# Patient Record
Sex: MALE | Race: WHITE
[De-identification: names, ages, dates, MRNs, and addresses within clinical notes are randomized per-mention and may not be internally consistent; named-entity substitution may affect disease eponyms.]

---

## 2018-02-26 ENCOUNTER — HOSPITAL ENCOUNTER (OUTPATIENT)
Dept: HOSPITAL 76 - LAB.WCP | Age: 53
Discharge: HOME | End: 2018-02-26
Attending: FAMILY MEDICINE
Payer: COMMERCIAL

## 2018-02-26 DIAGNOSIS — Z12.5: ICD-10-CM

## 2018-02-26 DIAGNOSIS — I10: ICD-10-CM

## 2018-02-26 DIAGNOSIS — R53.83: ICD-10-CM

## 2018-02-26 DIAGNOSIS — E87.5: Primary | ICD-10-CM

## 2018-02-26 DIAGNOSIS — E78.5: ICD-10-CM

## 2018-02-26 DIAGNOSIS — R06.09: ICD-10-CM

## 2018-02-26 LAB
ALBUMIN DIAFP-MCNC: 4.3 G/DL (ref 3.2–5.5)
ALBUMIN/GLOB SERPL: 1.3 {RATIO} (ref 1–2.2)
ALP SERPL-CCNC: 68 IU/L (ref 42–121)
ALT SERPL W P-5'-P-CCNC: 11 IU/L (ref 10–60)
ANION GAP SERPL CALCULATED.4IONS-SCNC: 8 MMOL/L (ref 6–13)
AST SERPL W P-5'-P-CCNC: 49 IU/L (ref 10–42)
BASOPHILS NFR BLD AUTO: 0 10^3/UL (ref 0–0.1)
BASOPHILS NFR BLD AUTO: 0.5 %
BILIRUB BLD-MCNC: 0.8 MG/DL (ref 0.2–1)
BUN SERPL-MCNC: 15 MG/DL (ref 6–20)
CALCIUM UR-MCNC: 8.7 MG/DL (ref 8.5–10.3)
CHLORIDE SERPL-SCNC: 103 MMOL/L (ref 101–111)
CHOLEST SERPL-MCNC: 225 MG/DL
CO2 SERPL-SCNC: 25 MMOL/L (ref 21–32)
CREAT SERPLBLD-SCNC: 0.9 MG/DL (ref 0.6–1.2)
EOSINOPHIL # BLD AUTO: 0.3 10^3/UL (ref 0–0.7)
EOSINOPHIL NFR BLD AUTO: 3.3 %
ERYTHROCYTE [DISTWIDTH] IN BLOOD BY AUTOMATED COUNT: 13.7 % (ref 12–15)
GFRSERPLBLD MDRD-ARVRAT: 89 ML/MIN/{1.73_M2} (ref 89–?)
GLOBULIN SER-MCNC: 3.3 G/DL (ref 2.1–4.2)
GLUCOSE SERPL-MCNC: 87 MG/DL (ref 70–100)
HDLC SERPL-MCNC: 33 MG/DL
HDLC SERPL: 6.8 {RATIO} (ref ?–5)
HGB UR QL STRIP: 15.8 G/DL (ref 14–18)
LDLC SERPL CALC-MCNC: 147 MG/DL
LDLC/HDLC SERPL: 4.5 {RATIO} (ref ?–3.6)
LYMPHOCYTES # SPEC AUTO: 2.6 10^3/UL (ref 1.5–3.5)
LYMPHOCYTES NFR BLD AUTO: 29.8 %
MCH RBC QN AUTO: 30.5 PG (ref 27–31)
MCHC RBC AUTO-ENTMCNC: 33.1 G/DL (ref 32–36)
MCV RBC AUTO: 92 FL (ref 80–94)
MONOCYTES # BLD AUTO: 0.9 10^3/UL (ref 0–1)
MONOCYTES NFR BLD AUTO: 9.8 %
NEUTROPHILS # BLD AUTO: 5 10^3/UL (ref 1.5–6.6)
NEUTROPHILS # SNV AUTO: 8.8 X10^3/UL (ref 4.8–10.8)
NEUTROPHILS NFR BLD AUTO: 56.6 %
PDW BLD AUTO: 8.6 FL (ref 7.4–11.4)
PLATELET # BLD: 186 10^3/UL (ref 130–450)
PROT SPEC-MCNC: 7.6 G/DL (ref 6.7–8.2)
RBC MAR: 5.19 10^6/UL (ref 4.7–6.1)
SODIUM SERPLBLD-SCNC: 136 MMOL/L (ref 135–145)
VLDLC SERPL-SCNC: 45 MG/DL

## 2018-02-26 PROCEDURE — 83880 ASSAY OF NATRIURETIC PEPTIDE: CPT

## 2018-02-26 PROCEDURE — 80061 LIPID PANEL: CPT

## 2018-02-26 PROCEDURE — 36415 COLL VENOUS BLD VENIPUNCTURE: CPT

## 2018-02-26 PROCEDURE — 84153 ASSAY OF PSA TOTAL: CPT

## 2018-02-26 PROCEDURE — 84443 ASSAY THYROID STIM HORMONE: CPT

## 2018-02-26 PROCEDURE — 85025 COMPLETE CBC W/AUTO DIFF WBC: CPT

## 2018-02-26 PROCEDURE — 83721 ASSAY OF BLOOD LIPOPROTEIN: CPT

## 2018-02-26 PROCEDURE — 80053 COMPREHEN METABOLIC PANEL: CPT

## 2019-04-11 ENCOUNTER — HOSPITAL ENCOUNTER (OUTPATIENT)
Dept: HOSPITAL 76 - LAB.WCP | Age: 54
Discharge: HOME | End: 2019-04-11
Attending: FAMILY MEDICINE
Payer: COMMERCIAL

## 2019-04-11 DIAGNOSIS — I10: ICD-10-CM

## 2019-04-11 DIAGNOSIS — G20: Primary | ICD-10-CM

## 2019-04-11 DIAGNOSIS — F41.9: ICD-10-CM

## 2019-04-11 LAB
ALBUMIN DIAFP-MCNC: 4.3 G/DL (ref 3.2–5.5)
ALBUMIN/GLOB SERPL: 1.3 {RATIO} (ref 1–2.2)
ALP SERPL-CCNC: 78 IU/L (ref 42–121)
ALT SERPL W P-5'-P-CCNC: 11 IU/L (ref 10–60)
ANION GAP SERPL CALCULATED.4IONS-SCNC: 6 MMOL/L (ref 6–13)
APTT PPP: 30.2 SECS (ref 24.9–33.3)
AST SERPL W P-5'-P-CCNC: 40 IU/L (ref 10–42)
BASOPHILS NFR BLD AUTO: 0 10^3/UL (ref 0–0.1)
BASOPHILS NFR BLD AUTO: 0.6 %
BILIRUB BLD-MCNC: 0.9 MG/DL (ref 0.2–1)
BUN SERPL-MCNC: 17 MG/DL (ref 6–20)
CALCIUM UR-MCNC: 8.7 MG/DL (ref 8.5–10.3)
CHLORIDE SERPL-SCNC: 104 MMOL/L (ref 101–111)
CO2 SERPL-SCNC: 29 MMOL/L (ref 21–32)
CREAT SERPLBLD-SCNC: 0.9 MG/DL (ref 0.6–1.2)
EOSINOPHIL # BLD AUTO: 0.1 10^3/UL (ref 0–0.7)
EOSINOPHIL NFR BLD AUTO: 2.1 %
ERYTHROCYTE [DISTWIDTH] IN BLOOD BY AUTOMATED COUNT: 13.8 % (ref 12–15)
GFRSERPLBLD MDRD-ARVRAT: 89 ML/MIN/{1.73_M2} (ref 89–?)
GLOBULIN SER-MCNC: 3.3 G/DL (ref 2.1–4.2)
GLUCOSE SERPL-MCNC: 100 MG/DL (ref 70–100)
HGB UR QL STRIP: 15.9 G/DL (ref 14–18)
INR PPP: 1 (ref 0.8–1.2)
LYMPHOCYTES # SPEC AUTO: 1.8 10^3/UL (ref 1.5–3.5)
LYMPHOCYTES NFR BLD AUTO: 27.2 %
MCH RBC QN AUTO: 31 PG (ref 27–31)
MCHC RBC AUTO-ENTMCNC: 33.8 G/DL (ref 32–36)
MCV RBC AUTO: 91.5 FL (ref 80–94)
MONOCYTES # BLD AUTO: 0.7 10^3/UL (ref 0–1)
MONOCYTES NFR BLD AUTO: 9.7 %
NEUTROPHILS # BLD AUTO: 4.1 10^3/UL (ref 1.5–6.6)
NEUTROPHILS # SNV AUTO: 6.7 X10^3/UL (ref 4.8–10.8)
NEUTROPHILS NFR BLD AUTO: 60.4 %
PDW BLD AUTO: 8.1 FL (ref 7.4–11.4)
PLATELET # BLD: 173 10^3/UL (ref 130–450)
PROT SPEC-MCNC: 7.6 G/DL (ref 6.7–8.2)
PROTHROM ACT/NOR PPP: 11.3 SECS (ref 9.9–12.6)
RBC MAR: 5.15 10^6/UL (ref 4.7–6.1)
SODIUM SERPLBLD-SCNC: 139 MMOL/L (ref 135–145)

## 2019-04-11 PROCEDURE — 80053 COMPREHEN METABOLIC PANEL: CPT

## 2019-04-11 PROCEDURE — 85025 COMPLETE CBC W/AUTO DIFF WBC: CPT

## 2019-04-11 PROCEDURE — 85610 PROTHROMBIN TIME: CPT

## 2019-04-11 PROCEDURE — 87640 STAPH A DNA AMP PROBE: CPT

## 2019-04-11 PROCEDURE — 36415 COLL VENOUS BLD VENIPUNCTURE: CPT

## 2019-04-11 PROCEDURE — 85730 THROMBOPLASTIN TIME PARTIAL: CPT

## 2019-09-13 ENCOUNTER — HOSPITAL ENCOUNTER (EMERGENCY)
Dept: HOSPITAL 76 - ED | Age: 54
Discharge: HOME | End: 2019-09-13
Payer: MEDICARE

## 2019-09-13 VITALS — DIASTOLIC BLOOD PRESSURE: 79 MMHG | SYSTOLIC BLOOD PRESSURE: 133 MMHG

## 2019-09-13 DIAGNOSIS — G20: ICD-10-CM

## 2019-09-13 DIAGNOSIS — L03.115: Primary | ICD-10-CM

## 2019-09-13 PROCEDURE — 99283 EMERGENCY DEPT VISIT LOW MDM: CPT

## 2019-09-13 PROCEDURE — 99282 EMERGENCY DEPT VISIT SF MDM: CPT

## 2019-09-13 NOTE — ED PHYSICIAN DOCUMENTATION
PD HPI SKIN





- Stated complaint


Stated Complaint: RT ANKLE BUG BITE/HOT/PURPLE





- Chief complaint


Chief Complaint: Wound





- History obtained from


History obtained from: Patient





- History of Present Illness


Timing - onset: Other (He thinks something bit him on the back of the right 

ankle may be 5 days ago and since then he has 2 little wounds with increasing 

mildly painful redness around that.  No fevers or chills.)





Review of Systems


Constitutional: denies: Fever, Chills


GI: denies: Abdominal Pain, Nausea, Vomiting


: reports: Reviewed and negative





PD PAST MEDICAL HISTORY





- Past Medical History


Past Medical History: Yes


Neuro: Parkinson's





- Present Medications


Home Medications: 


                                Ambulatory Orders











 Medication  Instructions  Recorded  Confirmed


 


Cephalexin [Keflex] 500 mg PO Q6H #28 capsule 09/13/19 














- Allergies


Allergies/Adverse Reactions: 


                                    Allergies











Allergy/AdvReac Type Severity Reaction Status Date / Time


 


No Known Drug Allergies Allergy   Verified 09/13/19 19:10














- Family History


Family history: reports: Non contributory





PD ED PE NORMAL





- Vitals


Vital signs reviewed: Yes





- General


General: Alert and oriented X 3, No acute distress





- Extremities


Extremities: Other (There is 2 tiny scabs on the back of the right ankle with 

mild surrounding cellulitis.  Normal range of motion.  No masses or swelling.  

No tenderness.  No pain out of proportion to exam.)





- Neuro


Neuro: Alert and oriented X 3, Normal speech





Results





- Vitals


Vitals: 





                               Vital Signs - 24 hr











  09/13/19





  19:08


 


Temperature 37.0 C


 


Heart Rate 86


 


Respiratory 20





Rate 


 


Blood Pressure 137/81 H


 


O2 Saturation 99








                                     Oxygen











O2 Source                      Room air

















Departure





- Departure


Disposition: 01 Home, Self Care


Clinical Impression: 


Cellulitis


Qualifiers:


 Site of cellulitis: extremity Site of cellulitis of extremity: lower extremity 

Laterality: right Qualified Code(s): L03.115 - Cellulitis of right lower limb





Condition: Good


Record reviewed to determine appropriate education?: Yes


Instructions:  Cellulitis Dc


Prescriptions: 


Cephalexin [Keflex] 500 mg PO Q6H #28 capsule


Comments: 


Return if worse or if you develop generalized illness such as fevers or chills. 

 Recheck with your doctor on Monday.

## 2020-06-19 ENCOUNTER — HOSPITAL ENCOUNTER (EMERGENCY)
Dept: HOSPITAL 76 - ED | Age: 55
Discharge: HOME | End: 2020-06-19
Payer: MEDICARE

## 2020-06-19 ENCOUNTER — HOSPITAL ENCOUNTER (OUTPATIENT)
Dept: HOSPITAL 76 - EMS | Age: 55
Discharge: ANOTHER HOSPITAL | End: 2020-06-19
Attending: SURGERY
Payer: MEDICARE

## 2020-06-19 VITALS — DIASTOLIC BLOOD PRESSURE: 65 MMHG | SYSTOLIC BLOOD PRESSURE: 132 MMHG

## 2020-06-19 DIAGNOSIS — S09.90XA: ICD-10-CM
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
## 2020-06-19 DIAGNOSIS — S01.01XA: Primary | ICD-10-CM
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
## 2020-06-19 DIAGNOSIS — S01.81XA: Primary | ICD-10-CM

## 2020-06-19 DIAGNOSIS — Z96.82: ICD-10-CM

## 2020-06-19 DIAGNOSIS — G20: ICD-10-CM

## 2020-06-19 PROCEDURE — 12032 INTMD RPR S/A/T/EXT 2.6-7.5: CPT

## 2020-06-19 PROCEDURE — 70450 CT HEAD/BRAIN W/O DYE: CPT

## 2020-06-19 NOTE — CT REPORT
PROCEDURE:  HEAD WO

 

INDICATIONS:  head trauma at site of DBS lead

 

TECHNIQUE:  

Noncontrast 4.5 mm thick angled axial sections acquired from the foramen magnum to the vertex.  For r
adiation dose reduction, the following was used:  automated exposure control, adjustment of mA and/or
 kV according to patient size.

 

COMPARISON:  None available at the time of this dictation.

 

FINDINGS:  

Image quality: There is streak artifact from the neural stimulators. CSF spaces:  Basal cisterns are 
patent.  No extra-axial fluid collections.  Ventricles are normal in size and shape.  

 

Brain:  Bilateral neural stimulators are seen. No midline shift.  No intracranial masses or hemorrhag
e.  Gray-white matter interface is normal.  

 

Skull and face:  Focal scalp swelling with high density can be seen involving the right superior fron
kimo region, at the site of the stimulator entry, measuring 1.7 cm. Calvarium and visualized facial belgica
igor are intact, without suspicious lesions.  There is an apparently partially calcified left posterio
r superior scalp lesion that measures 2 cm. An additional apparently partially calcified focus can be
 seen on the right posteriorly and laterally measuring 1.4 cm.

 

Sinuses:  Mild to moderate mucosal thickening is seen within the visualized right maxillary sinus. Vi
sualized sinuses and mastoids are otherwise clear.  

 

 

 

IMPRESSION:  Focal scalp swelling can be seen involving the right superior frontal region at the site
 of the simulator entry. This is most likely related to scalp hemorrhage. Please correlate with histo
ry and physical examination findings. 

 

A similar 2 cm focus can be seen on the left posteriorly and there is a 1.4 cm focus seen on the righ
t posteriorly. 

 

No acute intracranial hemorrhage or other acute intracranial abnormality can be seen.

 

Bilateral neural simulators with associated streak artifact.

 

 

Reviewed by: Frederick Aguilar MD on 6/19/2020 10:46 AM PIOTR

Approved by: Frederick Aguilar MD on 6/19/2020 10:46 AM PIOTR

 

 

Station ID:  [id number]
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
## 2021-08-23 ENCOUNTER — HOSPITAL ENCOUNTER (INPATIENT)
Dept: HOSPITAL 76 - ED | Age: 56
LOS: 3 days | Discharge: HOME | DRG: 340 | End: 2021-08-26
Attending: SURGERY | Admitting: SURGERY
Payer: MEDICARE

## 2021-08-23 DIAGNOSIS — G20: ICD-10-CM

## 2021-08-23 DIAGNOSIS — Z20.822: ICD-10-CM

## 2021-08-23 DIAGNOSIS — Z79.899: ICD-10-CM

## 2021-08-23 DIAGNOSIS — Z96.89: ICD-10-CM

## 2021-08-23 DIAGNOSIS — K35.33: Primary | ICD-10-CM

## 2021-08-23 DIAGNOSIS — E66.9: ICD-10-CM

## 2021-08-23 DIAGNOSIS — B96.20: ICD-10-CM

## 2021-08-23 LAB
ALBUMIN DIAFP-MCNC: 4.4 G/DL (ref 3.2–5.5)
ALBUMIN/GLOB SERPL: 1.3 {RATIO} (ref 1–2.2)
ALP SERPL-CCNC: 63 IU/L (ref 42–121)
ALT SERPL W P-5'-P-CCNC: 49 IU/L (ref 10–60)
ANION GAP SERPL CALCULATED.4IONS-SCNC: 13 MMOL/L (ref 6–13)
AST SERPL W P-5'-P-CCNC: 64 IU/L (ref 10–42)
B PARAPERT DNA SPEC QL NAA+PROBE: NOT DETECTED
B PERT DNA SPEC QL NAA+PROBE: NOT DETECTED
BASOPHILS NFR BLD AUTO: 0 10^3/UL (ref 0–0.1)
BASOPHILS NFR BLD AUTO: 0.3 %
BILIRUB BLD-MCNC: 1.6 MG/DL (ref 0.2–1)
BUN SERPL-MCNC: 30 MG/DL (ref 6–20)
C PNEUM DNA NPH QL NAA+NON-PROBE: NOT DETECTED
CALCIUM UR-MCNC: 8.7 MG/DL (ref 8.5–10.3)
CHLORIDE SERPL-SCNC: 98 MMOL/L (ref 101–111)
CLARITY UR REFRACT.AUTO: CLEAR
CO2 SERPL-SCNC: 21 MMOL/L (ref 21–32)
CREAT SERPLBLD-SCNC: 1.3 MG/DL (ref 0.6–1.2)
EOSINOPHIL # BLD AUTO: 0.3 10^3/UL (ref 0–0.7)
EOSINOPHIL NFR BLD AUTO: 2.5 %
ERYTHROCYTE [DISTWIDTH] IN BLOOD BY AUTOMATED COUNT: 13.2 % (ref 12–15)
FLUAV RNA RESP QL NAA+PROBE: NOT DETECTED
GFRSERPLBLD MDRD-ARVRAT: 58 ML/MIN/{1.73_M2} (ref 89–?)
GLOBULIN SER-MCNC: 3.4 G/DL (ref 2.1–4.2)
GLUCOSE SERPL-MCNC: 137 MG/DL (ref 70–100)
GLUCOSE UR QL STRIP.AUTO: NEGATIVE MG/DL
HAEM INFLU B DNA SPEC QL NAA+PROBE: NOT DETECTED
HCOV 229E RNA SPEC QL NAA+PROBE: NOT DETECTED
HCOV HKU1 RNA UPPER RESP QL NAA+PROBE: NOT DETECTED
HCOV NL63 RNA ASPIRATE QL NAA+PROBE: NOT DETECTED
HCOV OC43 RNA SPEC QL NAA+PROBE: NOT DETECTED
HCT VFR BLD AUTO: 47.6 % (ref 42–52)
HGB UR QL STRIP: 16.1 G/DL (ref 14–18)
HMPV AG SPEC QL: NOT DETECTED
HPIV1 RNA NPH QL NAA+PROBE: NOT DETECTED
HPIV2 SPEC QL CULT: NOT DETECTED
HPIV3 AB TITR SER CF: NOT DETECTED {TITER}
HPIV4 RNA SPEC QL NAA+PROBE: NOT DETECTED
KETONES UR QL STRIP.AUTO: 15 MG/DL
LIPASE SERPL-CCNC: 18 U/L (ref 22–51)
LYMPHOCYTES # SPEC AUTO: 0.5 10^3/UL (ref 1.5–3.5)
LYMPHOCYTES NFR BLD AUTO: 4.3 %
M PNEUMO DNA SPEC QL NAA+PROBE: NOT DETECTED
MCH RBC QN AUTO: 30.8 PG (ref 27–31)
MCHC RBC AUTO-ENTMCNC: 33.8 G/DL (ref 32–36)
MCV RBC AUTO: 91 FL (ref 80–94)
MONOCYTES # BLD AUTO: 0.8 10^3/UL (ref 0–1)
MONOCYTES NFR BLD AUTO: 6.5 %
MUCOUS THREADS URNS QL MICRO: (no result)
NEUTROPHILS # BLD AUTO: 10.2 10^3/UL (ref 1.5–6.6)
NEUTROPHILS # SNV AUTO: 11.9 X10^3/UL (ref 4.8–10.8)
NEUTROPHILS NFR BLD AUTO: 85.9 %
NITRITE UR QL STRIP.AUTO: NEGATIVE
NRBC # BLD AUTO: 0 /100WBC
NRBC # BLD AUTO: 0 X10^3/UL
PDW BLD AUTO: 9.4 FL (ref 7.4–11.4)
PH UR STRIP.AUTO: 5.5 PH (ref 5–7.5)
PLATELET # BLD: 120 10^3/UL (ref 130–450)
POTASSIUM SERPL-SCNC: 3.1 MMOL/L (ref 3.5–5)
PROT SPEC-MCNC: 7.8 G/DL (ref 6.7–8.2)
PROT UR STRIP.AUTO-MCNC: 30 MG/DL
RBC # UR STRIP.AUTO: (no result) /UL
RBC # URNS HPF: (no result) /HPF (ref 0–5)
RBC MAR: 5.23 10^6/UL (ref 4.7–6.1)
RBC MORPH BLD: (no result)
RSV RNA RESP QL NAA+PROBE: NOT DETECTED
RV+EV RNA SPEC QL NAA+PROBE: NOT DETECTED
SARS-COV-2 RNA PNL SPEC NAA+PROBE: NOT DETECTED
SODIUM SERPLBLD-SCNC: 132 MMOL/L (ref 135–145)
SP GR UR STRIP.AUTO: 1.02 (ref 1–1.03)
SQUAMOUS URNS QL MICRO: (no result)
UROBILINOGEN UR QL STRIP.AUTO: (no result) E.U./DL
UROBILINOGEN UR STRIP.AUTO-MCNC: NEGATIVE MG/DL
WBC # UR MANUAL: (no result) /HPF (ref 0–3)

## 2021-08-23 PROCEDURE — 74177 CT ABD & PELVIS W/CONTRAST: CPT

## 2021-08-23 PROCEDURE — 99285 EMERGENCY DEPT VISIT HI MDM: CPT

## 2021-08-23 PROCEDURE — 96365 THER/PROPH/DIAG IV INF INIT: CPT

## 2021-08-23 PROCEDURE — 96375 TX/PRO/DX INJ NEW DRUG ADDON: CPT

## 2021-08-23 PROCEDURE — 81003 URINALYSIS AUTO W/O SCOPE: CPT

## 2021-08-23 PROCEDURE — 80048 BASIC METABOLIC PNL TOTAL CA: CPT

## 2021-08-23 PROCEDURE — 85025 COMPLETE CBC W/AUTO DIFF WBC: CPT

## 2021-08-23 PROCEDURE — 0DTJ4ZZ RESECTION OF APPENDIX, PERCUTANEOUS ENDOSCOPIC APPROACH: ICD-10-PCS | Performed by: SURGERY

## 2021-08-23 PROCEDURE — 87181 SC STD AGAR DILUTION PER AGT: CPT

## 2021-08-23 PROCEDURE — 87070 CULTURE OTHR SPECIMN AEROBIC: CPT

## 2021-08-23 PROCEDURE — 87205 SMEAR GRAM STAIN: CPT

## 2021-08-23 PROCEDURE — 87086 URINE CULTURE/COLONY COUNT: CPT

## 2021-08-23 PROCEDURE — 80053 COMPREHEN METABOLIC PANEL: CPT

## 2021-08-23 PROCEDURE — 81001 URINALYSIS AUTO W/SCOPE: CPT

## 2021-08-23 PROCEDURE — 0202U NFCT DS 22 TRGT SARS-COV-2: CPT

## 2021-08-23 PROCEDURE — 99284 EMERGENCY DEPT VISIT MOD MDM: CPT

## 2021-08-23 PROCEDURE — 36415 COLL VENOUS BLD VENIPUNCTURE: CPT

## 2021-08-23 PROCEDURE — 44970 LAPAROSCOPY APPENDECTOMY: CPT

## 2021-08-23 PROCEDURE — 83690 ASSAY OF LIPASE: CPT

## 2021-08-23 PROCEDURE — 87631 RESP VIRUS 3-5 TARGETS: CPT

## 2021-08-23 RX ADMIN — SODIUM CHLORIDE SCH MLS/HR: 9 INJECTION, SOLUTION INTRAVENOUS at 20:45

## 2021-08-23 RX ADMIN — SODIUM CHLORIDE, PRESERVATIVE FREE SCH: 5 INJECTION INTRAVENOUS at 17:47

## 2021-08-23 RX ADMIN — ACETAMINOPHEN SCH MLS/HR: 10 INJECTION, SOLUTION INTRAVENOUS at 23:50

## 2021-08-23 RX ADMIN — CARBIDOPA AND LEVODOPA SCH TAB: 25; 100 TABLET ORAL at 16:58

## 2021-08-23 RX ADMIN — SODIUM CHLORIDE, PRESERVATIVE FREE SCH: 5 INJECTION INTRAVENOUS at 23:53

## 2021-08-23 RX ADMIN — ACETAMINOPHEN SCH MLS/HR: 10 INJECTION, SOLUTION INTRAVENOUS at 13:35

## 2021-08-23 RX ADMIN — SODIUM CHLORIDE SCH MLS/HR: 9 INJECTION, SOLUTION INTRAVENOUS at 14:12

## 2021-08-23 RX ADMIN — BENZOCAINE AND MENTHOL PRN LOZENGE: 15; 3.6 LOZENGE ORAL at 20:44

## 2021-08-23 RX ADMIN — ACETAMINOPHEN SCH MLS/HR: 10 INJECTION, SOLUTION INTRAVENOUS at 17:53

## 2021-08-23 RX ADMIN — ONDANSETRON PRN MG: 2 INJECTION INTRAMUSCULAR; INTRAVENOUS at 19:19

## 2021-08-23 RX ADMIN — GABAPENTIN SCH MG: 100 CAPSULE ORAL at 20:45

## 2021-08-23 RX ADMIN — CARBIDOPA AND LEVODOPA SCH TAB: 25; 100 TABLET ORAL at 19:15

## 2021-08-23 RX ADMIN — KETOROLAC TROMETHAMINE PRN MG: 30 INJECTION, SOLUTION INTRAMUSCULAR at 19:24

## 2021-08-23 RX ADMIN — SODIUM CHLORIDE SCH MLS/HR: 9 INJECTION, SOLUTION INTRAVENOUS at 14:30

## 2021-08-23 NOTE — ANESTHESIA
Pre-Anesthesia VS, & Labs





- Diagnosis





acute appendicitis





- Procedure





laparoscopic appendectomy


Vital Signs: 





                                        











Temp Pulse Resp BP Pulse Ox


 


 36.6 C   90   16   121/67   94 


 


 08/23/21 09:48  08/23/21 09:35  08/23/21 09:35  08/23/21 09:48  08/23/21 09:35











Height: 5 ft 11 in


Weight (kg): 119.748 kg


Body Mass Index: 36.8


BMI Classification: Obese





- NPO


>8 hours


Last Fluid Intake: sips water this am





- Lab Results


Current Lab Results: 





Laboratory Tests





08/23/21 07:46: Sodium 132 L, Potassium 3.1 L, Chloride 98 L, Carbon Dioxide 21,

Anion Gap 13.0, BUN 30 H, Creatinine 1.3 H, Estimated GFR (MDRD) 58 L, Glucose 

137 H, Calcium 8.7, Total Bilirubin 1.6 H, AST 64 H, ALT 49, Alkaline 

Phosphatase 63, Total Protein 7.8, Albumin 4.4, Globulin 3.4, Albumin/Globulin 

Ratio 1.3, Lipase 18 L


08/23/21 07:46: WBC 11.9 H, RBC 5.23, Hgb 16.1, Hct 47.6, MCV 91.0, MCH 30.8, 

MCHC 33.8, RDW 13.2, Plt Count 120 L, MPV 9.4, Neut # (Auto) 10.2 H, Lymph # (Au

to) 0.5 L, Mono # (Auto) 0.8, Eos # (Auto) 0.3, Baso # (Auto) 0.0, Absolute 

Nucleated RBC 0.00, Nucleated RBC % 0.0, Manual Slide Review Indicated, RBC 

Morph Micro Appear 1+ ANISOCYTOSIS








Lab results reviewed: Yes


Fish Bones: 


                                 08/23/21 07:46





                                 08/23/21 07:46





Home Medications and Allergies


Home Medications: 


Ambulatory Orders





Carbidopa/Levodopa 25/100 [Sinemet 25 mg/100 mg] 2 tab PO DAILY 08/23/21 


Gabapentin [Neurontin] 2 tab PO HS 08/23/21 


Ropinirole HCl [Ropinirole ER] 2 mg PO BID 08/23/21 


Sertraline [Zoloft] 1 tab PO DAILY 08/23/21 











                                        





Carbidopa/Levodopa 25/100 [Sinemet 25 mg/100 mg] 2 tab PO DAILY 08/23/21 


Gabapentin [Neurontin] 2 tab PO HS 08/23/21 


Ropinirole HCl [Ropinirole ER] 2 mg PO BID 08/23/21 


Sertraline [Zoloft] 1 tab PO DAILY 08/23/21 








Allergies/Adverse Reactions: 


                                    Allergies











Allergy/AdvReac Type Severity Reaction Status Date / Time


 


No Known Drug Allergies Allergy   Verified 08/23/21 07:44














Anes History & Medical History





- Anesthetic History


Anesthesia Complications: reports: No previous complications


Family history of Anesthesia Complications: Denies


Family history of Malignant Hyperthermia: Denies





- Medical History


Cardiovascular: reports: None


Pulmonary: reports: None


Neuro: reports: Parkinson's (DBS turned off for surgery)


Endocrine/Autoimmune: reports: None


Smoking Status: Never smoker


Other Past Medical History: with deep brain stimulator





Exam


General: Alert, Oriented x3, Cooperative


Dental: WNL


Mouth Opening: 3 Fingerbreadth


Neck Mobility: Normal


Mallampati classification: I


Thyromental Distance: greater than 6 cm


Respiratory: Lungs clear, Normal breath sounds, No respiratory distress


Cardiovascular: Regular rate


Abdomen: Other (tender at LRQ)


Neurological: Normal speech


Mental/Cognitive Status: Alert/Oriented X3, Normal for patient


Cognitive Status: Within normal limits





Plan


Anesthesia Type: General


Consent for Procedure(s) Verified and Reviewed: Yes


Code Status: Attempt Resuscitation


ASA classification: 3-Severe systemic disease


Is this case an emergency?: No

## 2021-08-23 NOTE — ED PHYSICIAN DOCUMENTATION
PD HPI ABD PAIN





- Stated complaint


Stated Complaint: ABD PX





- Chief complaint


Chief Complaint: Abd Pain





- History obtained from


History obtained from: Patient





- History of Present Illness


Timing - onset: How many days ago (2)


Timing - duration: Days (2)


Timing - details: Gradual onset, Still present


Quality: Cramping, Aching, Pain


Location: RLQ (but has generalized, with some abd bloating and nausea/vomiting 

the past day.)


Radiation: No: Chest, Right flank


Improved by: No: Eating (has not eaten since yesterday), Vomiting


Worsened by: Eating, Moving, Palpation


Associated symptoms: Nausea, Vomiting.  No: Fever, Diarrhea (soft stool today as

tried stool softener yesterday. No change in pain.), Constipation


Similar symptoms before: Has not had sx before


Recently seen: Not recently seen





Review of Systems


Constitutional: denies: Fever, Chills


Nose: denies: Rhinorrhea / runny nose, Congestion


Throat: denies: Sore throat


Respiratory: denies: Cough


GI: reports: Abdominal Pain, Abdominal Swelling (today feeling distended), 

Nausea, Vomiting.  denies: Constipation, Diarrhea, Bloody / black stool


: denies: Dysuria, Frequency


Skin: denies: Rash, Lesions


Musculoskeletal: denies: Back pain, Extremity swelling


Neurologic: reports: Other (baseline tremor due to Parkinsons.).  denies: 

Generalized weakness, Near syncope


Endocrine: denies: Weight loss


Immunocompromised: denies: Immunocompromised





PD PAST MEDICAL HISTORY





- Past Medical History


Cardiovascular: None


Respiratory: None


Neuro: Parkinson's


Endocrine/Autoimmune: None


Other Past Medical History: with deep brain stimulator





- Past Surgical History


Past Surgical History: No





- Present Medications


Home Medications: 


                                Ambulatory Orders











 Medication  Instructions  Recorded  Confirmed


 


cephALEXin [Keflex] 500 mg PO Q6H #28 capsule 09/13/19 


 


Carbidopa/Levodopa 25/100 [Sinemet 2 tab PO DAILY 08/23/21 08/23/21





25 mg/100 mg]   


 


Gabapentin [Neurontin] 2 tab PO HS 08/23/21 08/23/21


 


Ropinirole HCl [Ropinirole ER] 2 mg PO BID 08/23/21 08/23/21


 


Sertraline [Zoloft] 1 tab PO DAILY 08/23/21 08/23/21














- Allergies


Allergies/Adverse Reactions: 


                                    Allergies











Allergy/AdvReac Type Severity Reaction Status Date / Time


 


No Known Drug Allergies Allergy   Verified 08/23/21 07:44














- Social History


Does the pt smoke?: No


Smoking Status: Never smoker


Does the pt drink ETOH?: Yes


Does the pt have substance abuse?: No





- Immunizations


Immunizations are current?: No





- POLST


Patient has POLST: No





PD ED PE NORMAL





- Vitals


Vital signs reviewed: Yes





- General


General: Alert and oriented X 3, Well developed/nourished, Other (appears 

uncomfortable due to abd pain. )





- HEENT


HEENT: Pharynx benign.  No: Moist mucous membranes





- Neck


Neck: Supple, no meningeal sign, No adenopathy





- Cardiac


Cardiac: RRR (regular but mild tachycardia. ), No murmur





- Respiratory


Respiratory: No respiratory distress, Clear bilaterally





- Abdomen


Abdomen: Soft, No organomegaly, Other (moderate distention of abd with tender 

RLQ locally with guarding and percussion. No referred tenderness. Decreased 

bowel sounds. )





- Male 


Male : Deferred





- Rectal


Rectal: Deferred





- Derm


Derm: Normal color





- Neuro


Neuro: Alert and oriented X 3, No motor deficit, Normal speech, Other (mild 

tremoring in arms/hands c/w his Parkinsons. )





- Psych


Psych: Normal mood, Normal affect





Results





- Vitals


Vitals: 


                               Vital Signs - 24 hr











  08/23/21 08/23/21 08/23/21





  07:29 09:35 09:48


 


Temperature 36.6 C  36.6 C


 


Heart Rate 104 H 90 


 


Respiratory 22 16 





Rate   


 


Blood Pressure 136/95 H  121/67


 


O2 Saturation 95 94 








                                     Oxygen











O2 Source                      Room air

















- Labs


Labs: 


                                Laboratory Tests











  08/23/21 08/23/21





  07:46 07:46


 


WBC  11.9 H 


 


RBC  5.23 


 


Hgb  16.1 


 


Hct  47.6 


 


MCV  91.0 


 


MCH  30.8 


 


MCHC  33.8 


 


RDW  13.2 


 


Plt Count  120 L 


 


MPV  9.4 


 


Neut # (Auto)  10.2 H 


 


Lymph # (Auto)  0.5 L 


 


Mono # (Auto)  0.8 


 


Eos # (Auto)  0.3 


 


Baso # (Auto)  0.0 


 


Absolute Nucleated RBC  0.00 


 


Nucleated RBC %  0.0 


 


Manual Slide Review  Indicated 


 


RBC Morph Micro Appear  1+ ANISOCYTOSIS 


 


Sodium   132 L


 


Potassium   3.1 L


 


Chloride   98 L


 


Carbon Dioxide   21


 


Anion Gap   13.0


 


BUN   30 H


 


Creatinine   1.3 H


 


Estimated GFR (MDRD)   58 L


 


Glucose   137 H


 


Calcium   8.7


 


Total Bilirubin   1.6 H


 


AST   64 H


 


ALT   49


 


Alkaline Phosphatase   63


 


Total Protein   7.8


 


Albumin   4.4


 


Globulin   3.4


 


Albumin/Globulin Ratio   1.3


 


Lipase   18 L














- Rads (name of study)


  ** abd/pelvic CT


Radiology: Prelim report reviewed (acute appendicitis without perforation nor 

abscess. Some wall thickening distal ileum. ), See rad report





PD MEDICAL DECISION MAKING





- ED course


Complexity details: reviewed results (acute appendicitis with some wall 

thickening distal ileum. No perforation nor abscess. ), re-evaluated patient 

(pain improved with meds. ), considered differential (concerning for apy, kidney

 stone, diverticulitis, or other process. ), d/w patient





Departure





- Departure


Disposition: ED Transfer to [facility]


Clinical Impression: 


Appendicitis, acute


Qualifiers:


 Acute appendicitis type: with localized peritonitis Appendicitis gangrene 

presence: without gangrene Appendicitis perforation presence: without 

perforation Appendicitis abscess presence: without abscess Qualified Code(s): 

K35.30 - Acute appendicitis with localized peritonitis, without perforation or 

gangrene





Condition: Stable


Record reviewed to determine appropriate education?: Yes

## 2021-08-23 NOTE — ANESTHESIA POST OP EVALUATION
Anesthesia Post Eval





- Post Anesthesia Eval


Vitals: 





                                Last Vital Signs











Temp  37.1 C   08/23/21 14:56


 


Pulse  84   08/23/21 14:56


 


Resp  20   08/23/21 14:56


 


BP  126/80   08/23/21 14:56


 


Pulse Ox  97   08/23/21 14:56











CV Function Including HR & BP: Stable


Pain Control: Satisfactory


Nausea & Vomiting: Negative


Mental Status: Baseline


Respiratory Status: Airway Patent


Hydration Status: Satisfactory


Anesthesia Complications: None

## 2021-08-23 NOTE — HISTORY & PHYSICAL EXAMINATION
HPI





- Admitted From


Admitted from: ED





- History Obtained From


History obtained from: Patient


Exam limitations: No limitations





- History of Present Illness


Severity at the worst: reports: Moderate


Pain Quality: reports: Aching, Cramping


Context-Pain started w/: reports: Rest


Timing: reports: Gradual onset


Duration: reports: Days: (2)


Worsened by: reports: Exertion, Movement, Palpation


Associated symptoms: reports: Nausea


HPI Comment/Other: 





Very pleasant 55-year-old gentleman who presents with 2 days of right lower 

quadrant pain.  He has a history of Parkinson's disease and has a deep brain 

stimulator.  He says he has never had pain like this in the past.  He thought 

maybe he was impacted or constipated and so presented to the emergency room for 

relief.  He says he has been nauseated and has not eaten in the last 12 hours.





PMH/PSH





- Past Medical History


Cardiovascular: positive: None


Respiratory: positive: None


Neuro: positive: Parkinson's


Endocrine/Autoimmune: positive: None


MRSA Hx?: No


Other Past Medical History: with deep brain stimulator





Social & Family Hx





- Living Situation


Living Arrangement: At home


Living Situation: Alone (Has a cat and good relationship with land lord)





- Social History


Does the pt smoke?: No


Smoking Status: Never smoker


Does the pt drink ETOH?: Yes


Does the pt have substance abuse?: No





- POLST


Patient has POLST: No





Meds/Allgy





- Home Medications


Home Medications: 


                                Ambulatory Orders











 Medication  Instructions  Recorded  Confirmed


 


cephALEXin [Keflex] 500 mg PO Q6H #28 capsule 09/13/19 


 


Carbidopa/Levodopa 25/100 [Sinemet 2 tab PO DAILY 08/23/21 08/23/21





25 mg/100 mg]   


 


Gabapentin [Neurontin] 2 tab PO HS 08/23/21 08/23/21


 


Ropinirole HCl [Ropinirole ER] 2 mg PO BID 08/23/21 08/23/21


 


Sertraline [Zoloft] 1 tab PO DAILY 08/23/21 08/23/21














- Allergies


Allergies/Adverse Reactions: 


                                    Allergies











Allergy/AdvReac Type Severity Reaction Status Date / Time


 


No Known Drug Allergies Allergy   Verified 08/23/21 07:44














Review of Systems





- Constitutional


Constitutional: reports: Fatigue, Diaphoresis





- Gastrointestinal


Gastrointestinal: reports: Abdominal pain, Nausea





Exam





- Vital Signs


Reviewed Vital Signs: Yes


Vital Signs: 





                                Vital Signs x48h











  Temp Pulse Resp BP Pulse Ox


 


 08/23/21 09:48  36.6 C    121/67 


 


 08/23/21 09:35   90  16   94


 


 08/23/21 07:29  36.6 C  104 H  22  136/95 H  95














- Physical Exam


General Appearance: positive: Mild distress


Eyes Bilateral: positive: Normal inspection, PERRL, EOMI


Neck: positive: Nml inspection


Respiratory: positive: Chest non-tender, No respiratory distress, Breath sounds 

nml


Cardiovascular: positive: Regular rate & rhythm, No murmur


Peripheral Pulses: positive: 1+


Abdomen: positive: Nml bowel sounds, Tenderness, Guarding


Back: positive: Nml inspection


Skin: positive: Other (dignificant diaphoresis is noted)


Extremities: positive: Non-tender


Neurologic/Psychiatric: positive: Oriented x3, Other (Tremor is noted as he has 

not been able to keep his medication down.)





Results





- Lab Results


Fish Bones: 


                                 08/23/21 07:46





                                 08/23/21 07:46


Other Lab Results: 





                               Lab Results x24hrs











  08/23/21 08/23/21 Range/Units





  07:46 07:46 


 


WBC   11.9 H  (4.8-10.8)  x10^3/uL


 


RBC   5.23  (4.70-6.10)  10^6/uL


 


Hgb   16.1  (14.0-18.0)  g/dL


 


Hct   47.6  (42.0-52.0)  %


 


MCV   91.0  (80.0-94.0)  fL


 


MCH   30.8  (27.0-31.0)  pg


 


MCHC   33.8  (32.0-36.0)  g/dL


 


RDW   13.2  (12.0-15.0)  %


 


Plt Count   120 L  (130-450)  10^3/uL


 


MPV   9.4  (7.4-11.4)  fL


 


Neut # (Auto)   10.2 H  (1.5-6.6)  10^3/uL


 


Lymph # (Auto)   0.5 L  (1.5-3.5)  10^3/uL


 


Mono # (Auto)   0.8  (0.0-1.0)  10^3/uL


 


Eos # (Auto)   0.3  (0.0-0.7)  10^3/uL


 


Baso # (Auto)   0.0  (0.0-0.1)  10^3/uL


 


Absolute Nucleated RBC   0.00  x10^3/uL


 


Nucleated RBC %   0.0  /100WBC


 


Manual Slide Review   Indicated  


 


RBC Morph Micro Appear   1+ ANISOCYTOSIS  (NORMAL)  


 


Sodium  132 L   (135-145)  mmol/L


 


Potassium  3.1 L   (3.5-5.0)  mmol/L


 


Chloride  98 L   (101-111)  mmol/L


 


Carbon Dioxide  21   (21-32)  mmol/L


 


Anion Gap  13.0   (6-13)  


 


BUN  30 H   (6-20)  mg/dL


 


Creatinine  1.3 H   (0.6-1.2)  mg/dL


 


Estimated GFR (MDRD)  58 L   (>89)  


 


Glucose  137 H   ()  mg/dL


 


Calcium  8.7   (8.5-10.3)  mg/dL


 


Total Bilirubin  1.6 H   (0.2-1.0)  mg/dL


 


AST  64 H   (10-42)  IU/L


 


ALT  49   (10-60)  IU/L


 


Alkaline Phosphatase  63   ()  IU/L


 


Total Protein  7.8   (6.7-8.2)  g/dL


 


Albumin  4.4   (3.2-5.5)  g/dL


 


Globulin  3.4   (2.1-4.2)  g/dL


 


Albumin/Globulin Ratio  1.3   (1.0-2.2)  


 


Lipase  18 L   (22-51)  U/L














- Diagnostic Imaging Results


Diagnostic Imaging Results: positive: Final report reviewed


Diagnostic Imaging Results Comments: 





Acute appendicitis without evidence of perforation





- EKG Results


EKG Interpreted Independently: No





Impression/Plan





- Problem List


Problem List: 





Acute appendicitis





The patient has received Zosyn in the emergency room.  We have discussed the 

risks and benefits of laparoscopic appendectomy and the patient is expressed a 

desire to complete the procedure.

## 2021-08-23 NOTE — CT REPORT
PROCEDURE:  Abdomen/Pelvis W

 

INDICATIONS:  lower abd pain for 2 days

 

CONTRAST:  IV CONTRAST: Isovue 300 ml: 100 PO CONTRAST: *NO PO CONTRAST 

 

TECHNIQUE:  

After the administration of     contrast, 5 mm thick sections acquired from the diaphragms to the sym
physis.  5 mm thick coronal and sagittal reformats were acquired.  For radiation dose reduction, the 
following was used:  automated exposure control, adjustment of mA and/or kV according to patient size
.  

 

COMPARISON:  None.

 

FINDINGS:  

Image quality:  Excellent.  

 

ABDOMEN:  

Lung bases:  Lung bases are clear except for lung base atelectasis.  Heart size is normal.  

 

Solid organs:  Liver and spleen are normal in size and enhancement but the liver is prominently diffu
sely fatty infiltrated.  Gallbladder is isodense to the fatty infiltrated liver but shows no inflamma
tion.  Biliary system is non dilated.  Pancreas enhances normally.  No adrenal nodules.  Kidneys demo
nstrate normal size and enhancement, without hydronephrosis.  

 

Peritoneum and bowel: Colonic bowel loops demonstrate normal wall thickness and caliber.  Small bowel
 loops, however, are abnormally dilated and fluid-filled. Maximal caliber is approximately 4.8 cm. Up
per limits of normal is 3.0 cm. This pattern extends towards the terminal ileum. No free fluid or air
.  

 

Nodes and vessels:  No retroperitoneal or mesenteric adenopathy by size criteria.  Aorta and inferior
 vena cava are normal in size.  

 

Miscellaneous:  No ventral hernias.  

 

 

PELVIS:  

Genitourinary:  Bladder wall thickness is normal.  

 

Miscellaneous:  No inguinal hernias or adenopathy.  At the right lower quadrant there is peritoneal i
nflammation and thickening, without significant adjacent free fluid or any identified free air. The a
ppendix is abnormally prominent in caliber, measuring up to 1.2 cm in maximal dimension and with wilfrido
cent peritoneal inflammation. What appears to be a relatively radiolucent appendicolith is seen at th
e base of the appendix as it emanates from the cecum. No periappendiceal abscess is found.

 

Bones:  No suspicious bony lesions.  No vertebral body compression fractures.  

 

IMPRESSION:  Acute appendicitis right lower quadrant, with adjacent prominent peritoneal inflammation
, but no abscess formation.

 

Reactive ileus or inflammatory stenosis involving the small bowel, centered adjacent to the area of a
ppendicitis, with dilatation of the more superior small bowel up to 4.8 cm. No mass, volvulus, or int
ussusception involving the small bowel is seen.

 

Reviewed by: Kalpesh Mckeon MD on 8/23/2021 9:05 AM PDT

Approved by: Kalpesh Mckeon MD on 8/23/2021 9:05 AM PDT

 

 

Station ID:  [id number]

## 2021-08-23 NOTE — OPERATIVE REPORT
Operative Report





- General


Procedure Date: 08/23/21


Planned Procedure: 





Laparoscopic appendectomy


Pre-Op Diagnosis: Acute appendicitis


Procedure Performed: 





Laparoscopic appendectomy with peritoneal lavage and drain placement


Post Op Diagnosis: Perforated appendicitis with right lower quadrant phlegmon 

and abscess





- Procedure Note


Primary Surgeon: Epifanio


Anesthesia Provider: DERICK Root


Anesthesia Technique: General ET tube, Local


Pathology: 





Appendix to pathology in formalin


Estimated Blood Loss (mL): 20


Drain/Tube Type: Samuel drain (19 Ukrainian in the right lower quadrant)


Findings: 





Perforated appendicitis with large right lower quadrant phlegmon and abscess


Complications: 





None apparent





- Other


Other Information/Narrative: 





After obtaining informed consent, the patient is brought to the operating room 

and placed in the supine position on the operating table.  Following successful 

induction of general endotracheal anesthesia, appropriate padding of all bony 

prominences, and placement of appropriate monitors, the abdomen was prepped and 

draped in the standard surgical fashion.  A timeout was held per scope protocol.

 All elements of the surgical safety checklist were followed before, during, and

after the procedure.


Following infiltration with local anesthetic to create a field block, an 

incision was created inferior to the umbilicus and carried down through the skin

and subcutaneous tissue to reveal the fascia below.  2-0 Vicryl retention 

sutures were placed on either side of the midline and the abdomen was entered 

under direct vision using a 15 blade scalpel.  A 10 mm blunt Kelly balloon 

trocar was placed in the abdominal cavity and it was insufflated to 15 mmHg 

pressure.  The patient was placed in Trendelenburg position with the left side 

rotated toward the floor.  The camera was placed in the abdominal cavity and we 

immediately visualized the cecum in the right lower quadrant.  This was 

associated with a large right lower quadrant phlegmon.  Careful dissection 

revealed perforation with abscess and significant diffuse inflammatory response.

 The appendix was identified and eventually followed to its tip to defy the 

entire structure.  The appendix was grasped and elevated revealing its 

attachment to the cecum.  A window was created in the mesoappendix at this 

location.  A laparoscopic stapling device was used to ligate the appendix and 

liberated from its attachment to the cecum.  An additional load of the device 

were used to divide its mesentery.The appendix was placed in an Endo Catch bag 

and removed via the umbilical port. Aspiration of right lower quadrant fluid was

obtained for culture.The wound was irrigated with 1 L of warm saline solution 

and aspirated free of all fluid and particulate matter.A 19 Ukrainian Samuel drain 

was placed in the right lower quadrant and brought out in the right mid 

abdominal port.This was sewn into place with a nylon suture. The table was 

flattened and the abdomen evaluated once again.  The trochars were removed under

direct vision and abdomen was desufflated.  The umbilical incision was closed 

with interrupted Vicryl suture and Monocryl stitches were placed in the skin.  

All sponge, needles, and instrument counts were correct at the conclusion of the

case.  The patient was allowed to wake from anesthesia without difficulty and 

taken to the postanesthesia care unit in good condition.

## 2021-08-23 NOTE — PHARMACY PROGRESS NOTE
- Best Possible Medication History


Admit Date and Time: 08/23/21 1127


Processed by: Nursing


Medication History completed: Yes





As the person ultimately responsible for medication therapy, providers are able 

to order a medication from an existing home medication list in [facility] via the 

"Reconcile Routine" prior to Confirmation of that medication by support staff. 

Such practice is discouraged except when the physician, in their clinical 

judgment, deems that a medical need exists for a medication without regard to 

previous use.

## 2021-08-24 LAB
ALBUMIN DIAFP-MCNC: 3.8 G/DL (ref 3.2–5.5)
ALBUMIN/GLOB SERPL: 1.1 {RATIO} (ref 1–2.2)
ALP SERPL-CCNC: 60 IU/L (ref 42–121)
ALT SERPL W P-5'-P-CCNC: 19 IU/L (ref 10–60)
ANION GAP SERPL CALCULATED.4IONS-SCNC: 12 MMOL/L (ref 6–13)
AST SERPL W P-5'-P-CCNC: 53 IU/L (ref 10–42)
BASOPHILS NFR BLD AUTO: 0 10^3/UL (ref 0–0.1)
BASOPHILS NFR BLD AUTO: 0.2 %
BILIRUB BLD-MCNC: 1.3 MG/DL (ref 0.2–1)
BUN SERPL-MCNC: 27 MG/DL (ref 6–20)
CALCIUM UR-MCNC: 8 MG/DL (ref 8.5–10.3)
CHLORIDE SERPL-SCNC: 101 MMOL/L (ref 101–111)
CO2 SERPL-SCNC: 23 MMOL/L (ref 21–32)
CREAT SERPLBLD-SCNC: 1.1 MG/DL (ref 0.6–1.2)
EOSINOPHIL # BLD AUTO: 0.1 10^3/UL (ref 0–0.7)
EOSINOPHIL NFR BLD AUTO: 0.7 %
ERYTHROCYTE [DISTWIDTH] IN BLOOD BY AUTOMATED COUNT: 13.5 % (ref 12–15)
GFRSERPLBLD MDRD-ARVRAT: 70 ML/MIN/{1.73_M2} (ref 89–?)
GLOBULIN SER-MCNC: 3.4 G/DL (ref 2.1–4.2)
GLUCOSE SERPL-MCNC: 143 MG/DL (ref 70–100)
HCT VFR BLD AUTO: 42.6 % (ref 42–52)
HGB UR QL STRIP: 14.6 G/DL (ref 14–18)
LYMPHOCYTES # SPEC AUTO: 0.5 10^3/UL (ref 1.5–3.5)
LYMPHOCYTES NFR BLD AUTO: 4.2 %
MCH RBC QN AUTO: 31.4 PG (ref 27–31)
MCHC RBC AUTO-ENTMCNC: 34.3 G/DL (ref 32–36)
MCV RBC AUTO: 91.6 FL (ref 80–94)
MONOCYTES # BLD AUTO: 0.8 10^3/UL (ref 0–1)
MONOCYTES NFR BLD AUTO: 6.5 %
NEUTROPHILS # BLD AUTO: 10.7 10^3/UL (ref 1.5–6.6)
NEUTROPHILS # SNV AUTO: 12.2 X10^3/UL (ref 4.8–10.8)
NEUTROPHILS NFR BLD AUTO: 87.9 %
NRBC # BLD AUTO: 0 /100WBC
NRBC # BLD AUTO: 0 X10^3/UL
PDW BLD AUTO: 9.6 FL (ref 7.4–11.4)
PLATELET # BLD: 131 10^3/UL (ref 130–450)
POTASSIUM SERPL-SCNC: 3.2 MMOL/L (ref 3.5–5)
PROT SPEC-MCNC: 7.2 G/DL (ref 6.7–8.2)
RBC MAR: 4.65 10^6/UL (ref 4.7–6.1)
SODIUM SERPLBLD-SCNC: 136 MMOL/L (ref 135–145)

## 2021-08-24 RX ADMIN — ONDANSETRON PRN MG: 2 INJECTION INTRAMUSCULAR; INTRAVENOUS at 01:35

## 2021-08-24 RX ADMIN — GABAPENTIN SCH MG: 100 CAPSULE ORAL at 20:18

## 2021-08-24 RX ADMIN — SODIUM CHLORIDE SCH MLS/HR: 9 INJECTION, SOLUTION INTRAVENOUS at 09:50

## 2021-08-24 RX ADMIN — SERTRALINE HYDROCHLORIDE SCH MG: 50 TABLET ORAL at 08:24

## 2021-08-24 RX ADMIN — SODIUM CHLORIDE, PRESERVATIVE FREE SCH: 5 INJECTION INTRAVENOUS at 09:07

## 2021-08-24 RX ADMIN — ACETAMINOPHEN SCH MLS/HR: 10 INJECTION, SOLUTION INTRAVENOUS at 11:27

## 2021-08-24 RX ADMIN — SODIUM CHLORIDE SCH MLS/HR: 9 INJECTION, SOLUTION INTRAVENOUS at 15:55

## 2021-08-24 RX ADMIN — CARBIDOPA AND LEVODOPA SCH TAB: 25; 100 TABLET ORAL at 13:20

## 2021-08-24 RX ADMIN — CARBIDOPA AND LEVODOPA SCH TAB: 25; 100 TABLET ORAL at 06:51

## 2021-08-24 RX ADMIN — ENOXAPARIN SODIUM SCH MG: 100 INJECTION SUBCUTANEOUS at 08:27

## 2021-08-24 RX ADMIN — SODIUM CHLORIDE SCH MLS/HR: 9 INJECTION, SOLUTION INTRAVENOUS at 20:37

## 2021-08-24 RX ADMIN — SODIUM CHLORIDE SCH MG: 9 INJECTION, SOLUTION INTRAVENOUS at 06:51

## 2021-08-24 RX ADMIN — SODIUM CHLORIDE SCH MLS/HR: 9 INJECTION, SOLUTION INTRAVENOUS at 08:27

## 2021-08-24 RX ADMIN — CARBIDOPA AND LEVODOPA SCH TAB: 25; 100 TABLET ORAL at 09:49

## 2021-08-24 RX ADMIN — ONDANSETRON PRN MG: 2 INJECTION INTRAMUSCULAR; INTRAVENOUS at 11:24

## 2021-08-24 RX ADMIN — BENZOCAINE AND MENTHOL PRN LOZENGE: 15; 3.6 LOZENGE ORAL at 20:42

## 2021-08-24 RX ADMIN — SODIUM CHLORIDE SCH MLS/HR: 9 INJECTION, SOLUTION INTRAVENOUS at 18:34

## 2021-08-24 RX ADMIN — ACETAMINOPHEN SCH MLS/HR: 10 INJECTION, SOLUTION INTRAVENOUS at 23:41

## 2021-08-24 RX ADMIN — CARBIDOPA AND LEVODOPA SCH TAB: 25; 100 TABLET ORAL at 15:55

## 2021-08-24 RX ADMIN — CARBIDOPA AND LEVODOPA SCH TAB: 25; 100 TABLET ORAL at 18:36

## 2021-08-24 RX ADMIN — SODIUM CHLORIDE SCH MLS/HR: 9 INJECTION, SOLUTION INTRAVENOUS at 03:15

## 2021-08-24 RX ADMIN — SODIUM CHLORIDE, PRESERVATIVE FREE SCH: 5 INJECTION INTRAVENOUS at 15:55

## 2021-08-24 RX ADMIN — DOCUSATE SODIUM SCH MG: 250 CAPSULE, LIQUID FILLED ORAL at 08:23

## 2021-08-24 RX ADMIN — ACETAMINOPHEN SCH MLS/HR: 10 INJECTION, SOLUTION INTRAVENOUS at 18:34

## 2021-08-24 RX ADMIN — SODIUM CHLORIDE SCH MLS/HR: 9 INJECTION, SOLUTION INTRAVENOUS at 01:32

## 2021-08-24 RX ADMIN — ACETAMINOPHEN SCH MLS/HR: 10 INJECTION, SOLUTION INTRAVENOUS at 05:38

## 2021-08-24 NOTE — PROVIDER PROGRESS NOTE
Subjective





- General


Admit Date: 08/23/21


Procedure Date: 08/23/21


Post Op Days: 1


Procedure Performed: Lap Appy with drain placement





- Review of Systems


Drain Type: Samuel


Drain Output Description: Cloudy and malodorous


General: positive: Fatigue


Gastrointestinal: positive: Nausea.  negative: Vomiting





- Other


Other Information/Narrative: 





Patient reports he feels ok currently but has struggled with nausea and doesn't 

want to eat.  Pain is well controlled.  No vomiting.  Unsure regarding flatus





Objective





- Patient Data


Reviewed Vital Signs: Yes


Vital Signs: 





                                Vital Signs x48h











  Pulse Resp BP Pulse Ox


 


 08/24/21 08:19  75  19  143/82 H  93











Weight: 





                                     Weight











 08/22/21 08/23/21 08/24/21





 23:59 23:59 23:59


 


Weight (kg)  120 kg 











Intake & Output: 





                          Intake and Output Totals x24h











 08/22/21 08/23/21 08/24/21





 23:59 23:59 23:59


 


Intake Total  2620 2020.833


 


Output Total  450 1190


 


Balance  2170 830.833














- Lab Results


Lab Results: 


                                 08/24/21 04:55





                                 08/24/21 04:55


Other Lab Results: 





                               Lab Results x24hrs











  08/24/21 08/24/21 08/23/21 Range/Units





  04:55 04:55 17:00 


 


WBC   12.2 H   (4.8-10.8)  x10^3/uL


 


RBC   4.65 L   (4.70-6.10)  10^6/uL


 


Hgb   14.6   (14.0-18.0)  g/dL


 


Hct   42.6   (42.0-52.0)  %


 


MCV   91.6   (80.0-94.0)  fL


 


MCH   31.4 H   (27.0-31.0)  pg


 


MCHC   34.3   (32.0-36.0)  g/dL


 


RDW   13.5   (12.0-15.0)  %


 


Plt Count   131   (130-450)  10^3/uL


 


MPV   9.6   (7.4-11.4)  fL


 


Neut # (Auto)   10.7 H   (1.5-6.6)  10^3/uL


 


Lymph # (Auto)   0.5 L   (1.5-3.5)  10^3/uL


 


Mono # (Auto)   0.8   (0.0-1.0)  10^3/uL


 


Eos # (Auto)   0.1   (0.0-0.7)  10^3/uL


 


Baso # (Auto)   0.0   (0.0-0.1)  10^3/uL


 


Absolute Nucleated RBC   0.00   x10^3/uL


 


Nucleated RBC %   0.0   /100WBC


 


Sodium  136    (135-145)  mmol/L


 


Potassium  3.2 L    (3.5-5.0)  mmol/L


 


Chloride  101    (101-111)  mmol/L


 


Carbon Dioxide  23    (21-32)  mmol/L


 


Anion Gap  12.0    (6-13)  


 


BUN  27 H    (6-20)  mg/dL


 


Creatinine  1.1    (0.6-1.2)  mg/dL


 


Estimated GFR (MDRD)  70 L    (>89)  


 


Glucose  143 H    ()  mg/dL


 


Calcium  8.0 L    (8.5-10.3)  mg/dL


 


Total Bilirubin  1.3 H    (0.2-1.0)  mg/dL


 


AST  53 H    (10-42)  IU/L


 


ALT  19    (10-60)  IU/L


 


Alkaline Phosphatase  60    ()  IU/L


 


Total Protein  7.2    (6.7-8.2)  g/dL


 


Albumin  3.8    (3.2-5.5)  g/dL


 


Globulin  3.4    (2.1-4.2)  g/dL


 


Albumin/Globulin Ratio  1.1    (1.0-2.2)  


 


Urine Color    YELLOW  


 


Urine Clarity    CLEAR  (CLEAR)  


 


Urine pH    5.5  (5.0-7.5)  PH


 


Ur Specific Gravity    1.020  (1.002-1.030)  


 


Urine Protein    30 H  (NEGATIVE)  mg/dL


 


Urine Glucose (UA)    NEGATIVE  (NEGATIVE)  mg/dL


 


Urine Ketones    15 H  (NEGATIVE)  mg/dL


 


Urine Occult Blood    MODERATE H  (NEGATIVE)  


 


Urine Nitrite    NEGATIVE  (NEGATIVE)  


 


Urine Bilirubin    NEGATIVE  (NEGATIVE)  


 


Urine Urobilinogen    1 (NORMAL)  (NORMAL)  E.U./dL


 


Ur Leukocyte Esterase    NEGATIVE  (NEGATIVE)  


 


Urine RBC    6-10 H  (0-5)  /HPF


 


Urine WBC    0-3  (0-3)  /HPF


 


Ur Squamous Epith Cells    FEW Squamous  (<= Few)  


 


Amorphous Sediment    Rare  /LPF


 


Urine Bacteria    Rare  (None Seen)  /HPF


 


Urine Mucus    Few Strands  


 


Ur Microscopic Review    INDICATED  


 


Urine Culture Comments    NOT INDICATED  














- Current Medications


Current Medications: 





                               Current Medications











Generic Name Dose Route Start Last Admin





  Trade Name Freq  PRN Reason Stop Dose Admin


 


Carbidopa/Levodopa  2 tab  08/23/21 19:00  08/24/21 06:51





  Carbidopa/Levodopa 25 Mg/100 Mg Tablet  PO   2 tab





  Q12H BOYD   Administration


 


Carbidopa/Levodopa  2 tab  08/24/21 10:00  08/24/21 09:49





  Carbidopa/Levodopa 25 Mg/100 Mg Tablet  PO   2 tab





  1000 BOYD   Administration


 


Carbidopa/Levodopa  2 tab  08/23/21 16:00  08/23/21 16:58





  Carbidopa/Levodopa 25 Mg/100 Mg Tablet  PO   2 tab





  1600 BOYD   Administration


 


Docusate Sodium  250 mg  08/24/21 09:00  08/24/21 08:23





  Docusate Sodium 250 Mg Capsule  PO   250 mg





  DAILY BOYD   Administration


 


Enoxaparin Sodium  40 mg  08/24/21 09:00  08/24/21 08:27





  Enoxaparin 40 Mg/0.4 Ml Syringe  SUBQ   40 mg





  DAILY BOYD   Administration


 


Gabapentin  200 mg  08/23/21 21:00  08/23/21 20:45





  Gabapentin 100 Mg Capsule  PO   200 mg





  HS BOYD   Administration


 


Acetaminophen  100 mls @ 400 mls/hr  08/23/21 12:00  08/24/21 11:43





  Ofirmev  IV   Infused





  Q6H BOYD   Infusion


 


Piperacillin Sod/Tazobactam  100 mls @ 200 mls/hr  08/23/21 15:00  08/24/21 

09:00





  Sod 3.375 gm/ Sodium Chloride  IV   Infused





  Q6H BOYD   Infusion


 


Sodium Chloride  1,000 mls @ 125 mls/hr  08/23/21 14:00  08/24/21 11:43





  Normal Saline 0.9%  IV   125 mls/hr





  .Q8H BOYD   Infusion


 


Ketorolac Tromethamine  30 mg  08/23/21 11:27  08/23/21 19:24





  Ketorolac 30 Mg/Ml Vial  IVP  08/28/21 11:26  30 mg





  Q6H PRN   Administration





  PAIN  


 


Ondansetron HCl  4 mg  08/23/21 11:27  08/24/21 11:24





  Ondansetron 4 Mg/2 Ml Vial  IVP   4 mg





  Q6H PRN   Administration





  Nausea / Vomiting  


 


Pantoprazole Sodium  40 mg  08/24/21 07:00  08/24/21 06:51





  Pantoprazole 40 Mg Vial  IVP   40 mg





  QDAC BOYD   Administration


 


Ropinirole HCl  1 - 2 mg  08/23/21 21:00  08/24/21 08:23





  Ropinirole 1 Mg Tablet  PO   1 mg





  BID BOYD   Administration


 


Sertraline HCl  50 mg  08/24/21 09:00  08/24/21 08:24





  Sertraline 50 Mg Tablet  PO   50 mg





  DAILY BOYD   Administration


 


Sodium Chloride  10 ml  08/23/21 17:00  08/24/21 09:07





  Sodium Chloride Flush 0.9% 10 Ml Syringe  IVP   Not Given





  0100,0900,1700 BOYD  


 


Throat Lozenges  1 lozenge  08/23/21 19:34  08/23/21 20:44





  Benzocaine/Menthol Lozenge  MM   1 lozenge





  Q2HR PRN   Administration





  Throat pain  














- Physical Exam


Wound/Incisions: positive: Dressing dry and intact


General Appearance: positive: No acute distress


Eyes Bilateral: positive: Normal inspection, PERRL, EOMI


Respiratory: positive: No respiratory distress


Cardiovascular: positive: Regular rate & rhythm


Abdomen: positive: Tenderness, Other (Hypoactive bowel tones.  Appropriately 

tender)


Back: positive: Nml inspection.  negative: CVA tenderness (R), CVA tenderness (L

)


Skin: positive: Color nml


Neurologic/Psychiatric: positive: Oriented x3





ABX Reporting


Has patient been on IV antibiotics over the past 48 hours?: Yes





Impression/Plan





- Problem List


Problem List: 





Gram stain consistent with multiple organisms.  Will continue Zosyn and await 

culture.  Continue other medications.

## 2021-08-25 LAB
ANION GAP SERPL CALCULATED.4IONS-SCNC: 9 MMOL/L (ref 6–13)
BASOPHILS NFR BLD AUTO: 0 10^3/UL (ref 0–0.1)
BASOPHILS NFR BLD AUTO: 0.5 %
BUN SERPL-MCNC: 18 MG/DL (ref 6–20)
CALCIUM UR-MCNC: 7.9 MG/DL (ref 8.5–10.3)
CHLORIDE SERPL-SCNC: 106 MMOL/L (ref 101–111)
CO2 SERPL-SCNC: 22 MMOL/L (ref 21–32)
CREAT SERPLBLD-SCNC: 0.8 MG/DL (ref 0.6–1.2)
EOSINOPHIL # BLD AUTO: 0 10^3/UL (ref 0–0.7)
EOSINOPHIL NFR BLD AUTO: 0.1 %
ERYTHROCYTE [DISTWIDTH] IN BLOOD BY AUTOMATED COUNT: 13.7 % (ref 12–15)
GFRSERPLBLD MDRD-ARVRAT: 101 ML/MIN/{1.73_M2} (ref 89–?)
GLUCOSE SERPL-MCNC: 114 MG/DL (ref 70–100)
HCT VFR BLD AUTO: 40.8 % (ref 42–52)
HGB UR QL STRIP: 13.5 G/DL (ref 14–18)
LYMPHOCYTES # SPEC AUTO: 0.7 10^3/UL (ref 1.5–3.5)
LYMPHOCYTES NFR BLD AUTO: 9.1 %
MCH RBC QN AUTO: 31 PG (ref 27–31)
MCHC RBC AUTO-ENTMCNC: 33.1 G/DL (ref 32–36)
MCV RBC AUTO: 93.6 FL (ref 80–94)
MONOCYTES # BLD AUTO: 0.9 10^3/UL (ref 0–1)
MONOCYTES NFR BLD AUTO: 11.8 %
NEUTROPHILS # BLD AUTO: 6.1 10^3/UL (ref 1.5–6.6)
NEUTROPHILS # SNV AUTO: 7.9 X10^3/UL (ref 4.8–10.8)
NEUTROPHILS NFR BLD AUTO: 77.7 %
NRBC # BLD AUTO: 0 /100WBC
NRBC # BLD AUTO: 0 X10^3/UL
PDW BLD AUTO: 9.8 FL (ref 7.4–11.4)
PLATELET # BLD: 155 10^3/UL (ref 130–450)
POTASSIUM SERPL-SCNC: 3.1 MMOL/L (ref 3.5–5)
RBC MAR: 4.36 10^6/UL (ref 4.7–6.1)
SODIUM SERPLBLD-SCNC: 137 MMOL/L (ref 135–145)

## 2021-08-25 RX ADMIN — CARBIDOPA AND LEVODOPA SCH TAB: 25; 100 TABLET ORAL at 05:47

## 2021-08-25 RX ADMIN — PHENOL PRN SPRAYS: 1.4 SPRAY ORAL at 10:54

## 2021-08-25 RX ADMIN — SERTRALINE HYDROCHLORIDE SCH MG: 50 TABLET ORAL at 08:48

## 2021-08-25 RX ADMIN — PHENOL PRN SPRAYS: 1.4 SPRAY ORAL at 12:47

## 2021-08-25 RX ADMIN — CARBIDOPA AND LEVODOPA SCH TAB: 25; 100 TABLET ORAL at 16:13

## 2021-08-25 RX ADMIN — SODIUM CHLORIDE, PRESERVATIVE FREE SCH: 5 INJECTION INTRAVENOUS at 04:03

## 2021-08-25 RX ADMIN — SODIUM CHLORIDE, PRESERVATIVE FREE SCH: 5 INJECTION INTRAVENOUS at 08:48

## 2021-08-25 RX ADMIN — CARBIDOPA AND LEVODOPA SCH TAB: 25; 100 TABLET ORAL at 12:55

## 2021-08-25 RX ADMIN — ACETAMINOPHEN SCH MLS/HR: 10 INJECTION, SOLUTION INTRAVENOUS at 12:45

## 2021-08-25 RX ADMIN — SODIUM CHLORIDE SCH MLS/HR: 9 INJECTION, SOLUTION INTRAVENOUS at 02:59

## 2021-08-25 RX ADMIN — SODIUM CHLORIDE SCH MLS/HR: 9 INJECTION, SOLUTION INTRAVENOUS at 02:58

## 2021-08-25 RX ADMIN — BENZOCAINE AND MENTHOL PRN LOZENGE: 15; 3.6 LOZENGE ORAL at 05:58

## 2021-08-25 RX ADMIN — ACETAMINOPHEN SCH MLS/HR: 10 INJECTION, SOLUTION INTRAVENOUS at 17:57

## 2021-08-25 RX ADMIN — KETOROLAC TROMETHAMINE PRN MG: 30 INJECTION, SOLUTION INTRAMUSCULAR at 16:13

## 2021-08-25 RX ADMIN — SODIUM CHLORIDE SCH MG: 9 INJECTION, SOLUTION INTRAVENOUS at 05:49

## 2021-08-25 RX ADMIN — DOCUSATE SODIUM SCH: 250 CAPSULE, LIQUID FILLED ORAL at 08:48

## 2021-08-25 RX ADMIN — SODIUM CHLORIDE SCH: 9 INJECTION, SOLUTION INTRAVENOUS at 14:34

## 2021-08-25 RX ADMIN — GABAPENTIN SCH MG: 100 CAPSULE ORAL at 21:38

## 2021-08-25 RX ADMIN — ENOXAPARIN SODIUM SCH MG: 100 INJECTION SUBCUTANEOUS at 08:48

## 2021-08-25 RX ADMIN — SODIUM CHLORIDE SCH MLS/HR: 9 INJECTION, SOLUTION INTRAVENOUS at 22:50

## 2021-08-25 RX ADMIN — SODIUM CHLORIDE SCH MLS/HR: 9 INJECTION, SOLUTION INTRAVENOUS at 14:35

## 2021-08-25 RX ADMIN — CARBIDOPA AND LEVODOPA SCH TAB: 25; 100 TABLET ORAL at 19:09

## 2021-08-25 RX ADMIN — CARBIDOPA AND LEVODOPA SCH TAB: 25; 100 TABLET ORAL at 10:49

## 2021-08-25 RX ADMIN — SODIUM CHLORIDE, PRESERVATIVE FREE SCH: 5 INJECTION INTRAVENOUS at 17:37

## 2021-08-25 RX ADMIN — ACETAMINOPHEN SCH MLS/HR: 10 INJECTION, SOLUTION INTRAVENOUS at 05:52

## 2021-08-25 RX ADMIN — SODIUM CHLORIDE SCH MLS/HR: 9 INJECTION, SOLUTION INTRAVENOUS at 08:55

## 2021-08-26 VITALS — SYSTOLIC BLOOD PRESSURE: 129 MMHG | DIASTOLIC BLOOD PRESSURE: 75 MMHG

## 2021-08-26 LAB
ANION GAP SERPL CALCULATED.4IONS-SCNC: 8 MMOL/L (ref 6–13)
BASOPHILS NFR BLD AUTO: 0 10^3/UL (ref 0–0.1)
BASOPHILS NFR BLD AUTO: 0.6 %
BUN SERPL-MCNC: 12 MG/DL (ref 6–20)
CALCIUM UR-MCNC: 8.2 MG/DL (ref 8.5–10.3)
CHLORIDE SERPL-SCNC: 106 MMOL/L (ref 101–111)
CO2 SERPL-SCNC: 22 MMOL/L (ref 21–32)
CREAT SERPLBLD-SCNC: 0.7 MG/DL (ref 0.6–1.2)
EOSINOPHIL # BLD AUTO: 0.2 10^3/UL (ref 0–0.7)
EOSINOPHIL NFR BLD AUTO: 2.6 %
ERYTHROCYTE [DISTWIDTH] IN BLOOD BY AUTOMATED COUNT: 14 % (ref 12–15)
GFRSERPLBLD MDRD-ARVRAT: 118 ML/MIN/{1.73_M2} (ref 89–?)
GLUCOSE SERPL-MCNC: 112 MG/DL (ref 70–100)
HCT VFR BLD AUTO: 40.7 % (ref 42–52)
HGB UR QL STRIP: 13.1 G/DL (ref 14–18)
LYMPHOCYTES # SPEC AUTO: 1.2 10^3/UL (ref 1.5–3.5)
LYMPHOCYTES NFR BLD AUTO: 17.4 %
MCH RBC QN AUTO: 30.7 PG (ref 27–31)
MCHC RBC AUTO-ENTMCNC: 32.2 G/DL (ref 32–36)
MCV RBC AUTO: 95.3 FL (ref 80–94)
MONOCYTES # BLD AUTO: 0.6 10^3/UL (ref 0–1)
MONOCYTES NFR BLD AUTO: 9.1 %
NEUTROPHILS # BLD AUTO: 4.9 10^3/UL (ref 1.5–6.6)
NEUTROPHILS # SNV AUTO: 7 X10^3/UL (ref 4.8–10.8)
NEUTROPHILS NFR BLD AUTO: 69.3 %
NRBC # BLD AUTO: 0 /100WBC
NRBC # BLD AUTO: 0 X10^3/UL
PDW BLD AUTO: 9.8 FL (ref 7.4–11.4)
PLATELET # BLD: 163 10^3/UL (ref 130–450)
POTASSIUM SERPL-SCNC: 3.2 MMOL/L (ref 3.5–5)
RBC MAR: 4.27 10^6/UL (ref 4.7–6.1)
SODIUM SERPLBLD-SCNC: 136 MMOL/L (ref 135–145)

## 2021-08-26 RX ADMIN — ACETAMINOPHEN SCH MLS/HR: 10 INJECTION, SOLUTION INTRAVENOUS at 06:17

## 2021-08-26 RX ADMIN — SODIUM CHLORIDE SCH MG: 9 INJECTION, SOLUTION INTRAVENOUS at 06:13

## 2021-08-26 RX ADMIN — ACETAMINOPHEN SCH MLS/HR: 10 INJECTION, SOLUTION INTRAVENOUS at 12:47

## 2021-08-26 RX ADMIN — ACETAMINOPHEN SCH MLS/HR: 10 INJECTION, SOLUTION INTRAVENOUS at 01:04

## 2021-08-26 RX ADMIN — ENOXAPARIN SODIUM SCH MG: 100 INJECTION SUBCUTANEOUS at 09:19

## 2021-08-26 RX ADMIN — SODIUM CHLORIDE, PRESERVATIVE FREE SCH: 5 INJECTION INTRAVENOUS at 01:07

## 2021-08-26 RX ADMIN — CARBIDOPA AND LEVODOPA SCH TAB: 25; 100 TABLET ORAL at 09:51

## 2021-08-26 RX ADMIN — DOCUSATE SODIUM SCH: 250 CAPSULE, LIQUID FILLED ORAL at 09:21

## 2021-08-26 RX ADMIN — CARBIDOPA AND LEVODOPA SCH TAB: 25; 100 TABLET ORAL at 13:03

## 2021-08-26 RX ADMIN — SERTRALINE HYDROCHLORIDE SCH MG: 50 TABLET ORAL at 09:26

## 2021-08-26 RX ADMIN — BENZOCAINE AND MENTHOL PRN LOZENGE: 15; 3.6 LOZENGE ORAL at 06:12

## 2021-08-26 RX ADMIN — CARBIDOPA AND LEVODOPA SCH TAB: 25; 100 TABLET ORAL at 06:12

## 2021-08-26 RX ADMIN — SODIUM CHLORIDE SCH MLS/HR: 9 INJECTION, SOLUTION INTRAVENOUS at 06:36

## 2021-08-26 RX ADMIN — SODIUM CHLORIDE, PRESERVATIVE FREE SCH: 5 INJECTION INTRAVENOUS at 09:21

## 2021-08-26 NOTE — PROVIDER PROGRESS NOTE
Subjective





- General


Admit Date: 08/23/21


Procedure Date: 08/23/21


Post Op Days: 3


Procedure Performed: Lap Appy with drain placement





- Review of Systems


Wound/Incisions: positive: Dressing dry and intact


Drain Type: Samuel


Drain Output Description: Cloudy and malodorous


General: positive: Fatigue


Gastrointestinal: positive: Nausea.  negative: Vomiting





- Other


Other Information/Narrative: 





Late entry.  Patient seen 8/25/2021 at 1625





Feeling better today though frustrated with diarrhea.  Feeling weak. Sitting up 

at the bedside.  Wants to know if this is his Parkinsons or if it is normal for 

the situation.  Nausea has resolved. Would like a more substantial diet.





Objective





- Patient Data


Vital Signs: 





                                Vital Signs x48h











  Temp Pulse Resp BP Pulse Ox


 


 08/26/21 09:27  36.8 C  67  18  129/75  97











Intake & Output: 





                          Intake and Output Totals x24h











 08/24/21 08/25/21 08/26/21





 23:59 23:59 23:59


 


Intake Total 4217.083 5059.167 1702.084


 


Output Total 1690 1310 195


 


Balance 2527.083 3749.167 1507.084














- Lab Results


Lab Results: 


                                 08/25/21 05:58





                                 08/25/21 05:58





- Current Medications


Current Medications: 





                               Current Medications











Generic Name Dose Route Start Last Admin





  Trade Name Freq  PRN Reason Stop Dose Admin


 


Carbidopa/Levodopa  2 tab  08/23/21 19:00  08/26/21 06:12





  Carbidopa/Levodopa 25 Mg/100 Mg Tablet  PO   2 tab





  Q12H BOYD   Administration


 


Carbidopa/Levodopa  2 tab  08/24/21 10:00  08/26/21 09:51





  Carbidopa/Levodopa 25 Mg/100 Mg Tablet  PO   2 tab





  1000 BOYD   Administration


 


Carbidopa/Levodopa  2 tab  08/23/21 16:00  08/25/21 16:13





  Carbidopa/Levodopa 25 Mg/100 Mg Tablet  PO   2 tab





  1600 BOYD   Administration


 


Carbidopa/Levodopa  2.5 tab  08/24/21 13:00  08/25/21 12:55





  Carbidopa/Levodopa 25 Mg/100 Mg Tablet  PO   2.5 tab





  1300 BOYD   Administration


 


Docusate Sodium  250 mg  08/24/21 09:00  08/26/21 09:21





  Docusate Sodium 250 Mg Capsule  PO   Not Given





  DAILY BOYD  


 


Enoxaparin Sodium  40 mg  08/24/21 09:00  08/26/21 09:19





  Enoxaparin 40 Mg/0.4 Ml Syringe  SUBQ   40 mg





  DAILY BOYD   Administration


 


Gabapentin  200 mg  08/23/21 21:00  08/25/21 21:38





  Gabapentin 100 Mg Capsule  PO   200 mg





  HS BOYD   Administration


 


Acetaminophen  100 mls @ 400 mls/hr  08/23/21 12:00  08/26/21 06:32





  Ofirmev  IV   Infused





  Q6H BOYD   Infusion


 


Sodium Chloride  1,000 mls @ 125 mls/hr  08/23/21 14:00  08/26/21 06:36





  Normal Saline 0.9%  IV   125 mls/hr





  .Q8H BOYD   Administration


 


Levofloxacin  750 mg in 150 mls @ 100 mls/hr  08/25/21 10:00  08/26/21 09:51





  Levaquin 750 Mg/150 Ml  IV   100 mls/hr





  Q24H BOYD   Administration


 


Ketorolac Tromethamine  30 mg  08/23/21 11:27  08/25/21 16:13





  Ketorolac 30 Mg/Ml Vial  IVP  08/28/21 11:26  30 mg





  Q6H PRN   Administration





  PAIN  


 


Metoclopramide HCl  5 mg  08/24/21 14:58  08/24/21 15:11





  Metoclopramide 10 Mg/2 Ml Vial  IVP   5 mg





  Q6HR PRN   Administration





  Nausea / Vomiting  


 


Ondansetron HCl  4 mg  08/23/21 11:27  08/24/21 11:24





  Ondansetron 4 Mg/2 Ml Vial  IVP   4 mg





  Q6H PRN   Administration





  Nausea / Vomiting  


 


Pantoprazole Sodium  40 mg  08/24/21 07:00  08/26/21 06:13





  Pantoprazole 40 Mg Vial  IVP   40 mg





  QDAC BOYD   Administration


 


Phenol/Menthol  2 sprays  08/25/21 09:03  08/25/21 12:47





  Phenol Throat Spray 177 Ml  MM   2 sprays





  Q2HR PRN   Administration





  Throat Pain  


 


Ropinirole HCl  1 - 2 mg  08/23/21 21:00  08/26/21 09:19





  Ropinirole 1 Mg Tablet  PO   1 mg





  BID BOYD   Administration


 


Sertraline HCl  50 mg  08/24/21 09:00  08/26/21 09:26





  Sertraline 50 Mg Tablet  PO   50 mg





  DAILY BOYD   Administration


 


Sodium Chloride  10 ml  08/23/21 17:00  08/26/21 09:21





  Sodium Chloride Flush 0.9% 10 Ml Syringe  IVP   Not Given





  0100,0900,1700 BOYD  


 


Sodium Chloride  10 ml  08/23/21 11:27  08/26/21 06:14





  Sodium Chloride Flush 0.9% 10 Ml Syringe  IVP   10 ml





  PRN PRN   Administration





  AS NEEDED PER PROVIDER ORDERS  


 


Throat Lozenges  1 lozenge  08/23/21 19:34  08/26/21 06:12





  Benzocaine/Menthol Lozenge  MM   1 lozenge





  Q2HR PRN   Administration





  Throat pain  














- Physical Exam


Wound/Incisions: positive: Healing well


Abdomen: positive: Nml bowel sounds, Tenderness.  negative: Guarding, Rebound 

(Drainage is more serous today and less cloudy)





ABX Reporting


Has patient been on IV antibiotics over the past 48 hours?: Yes





Impression/Plan





- Problem List


Problem List: 





Perforated appendicitis with abscess and phlegmon in the setting of a gentleman 

with Parkinson's





1. Advance diet.


2. Change antibiotic to Levaquin based on culture results.


3. Check c. diff in the setting of diarrhea 


4. PT to help with mobilization

## 2021-08-26 NOTE — DISCHARGE PLAN
Discharge Plan


Problem Reviewed?: Yes


Disposition: 01 Home, Self Care


Condition: Stable


Prescriptions: 


oxyCODONE [Roxicodone] 5 mg PO Q6HR PRN #7 tablet


 PRN Reason: Pain


levoFLOXacin 750 MG/150 ML [Levaquin 750 mg/150 ml] 750 mg IV Q24H #5 ml


Diet: Regular


Activity Restrictions: Additional Comments (10 pound lifting limit)


Shower Restrictions: No


Driving Restrictions: Yes (No driving)


Assistance Devices: Other (As prior to surgery)


No Smoking: If you smoke, Please STOP!  Call [phone] for help.


Follow-up with: 


MAXI Godfrey MD [Provider Admit Priv/Credential] -

## 2021-08-26 NOTE — DISCHARGE SUMMARY
Discharge Summary


Admit Date: 08/23/21


Discharge Date: 08/26/21


Discharging Provider: MD Epifanio


Primary Care Provider: MD Steve


Code Status: Attempt Resuscitation


Condition at Discharge: Stable


Discharge Disposition: 01 Home, Self Care





- DIAGNOSES


Admission Diagnoses: 





Acute appendicitis


Discharge Diagnoses with Status of Each Condition: 





Perforated appendicitis with abscess





- HPI


History of Present Illness: 





ibis torres 55-year-old gentleman who presents with 2 days of right lower 

quadrant pain.  He has a history of Parkinson's disease and has a deep brain 

stimulator.  He says he has never had pain like this in the past.  He thought 

maybe he was impacted or constipated and so presented to the emergency room for 

relief.  He says he has been nauseated and has not eaten in the last 12 hours. 

Mr. Sue was diagnosed with appendicitis on CT and taken to the operating room 

for urgent appendectomy








- CONSULTS | PROCEDURES


Consultations: None


Procedures: 





Laparoscopy with appendectomy, drainage of intraperitoneal abscess and drain 

placement





- HOSPITAL COURSE


Hospital Course: 





Ortiz was admitted following his surgical procedure to the medical surgical 

floor. This single broad-spectrum agent in the short-term. The first 

postoperative day was quite difficult for him as he had significant nausea 

vomiting and pain. By the second postoperative day we did get his cultures back 

growing E. coli resistant to penicillins. His antibiotic was switched to 

Levaquin. The following day his nausea had resolved and his CESAR drainage was 

clear.He has been eating a regular diet without difficulty. He has shuffling 

steps consistent with Parkinson's disease but was able to walk the halls with 

physical therapy assistance. He is discharged to his home in the care of his 

family. He has no shower restrictions. He has a 10 pound lifting restriction. He

is advised not to drive a car and to use any assistance devices that he was 

using prior to his admission to the hospital. He will be sent home with Levaquin

for an additional 5 days giving him a total of 10 days. We will see him back in 

my office in 2 weeks.





- ALLERGIES


Allergies/Adverse Reactions: 


                                    Allergies











Allergy/AdvReac Type Severity Reaction Status Date / Time


 


No Known Drug Allergies Allergy   Verified 08/23/21 07:44














- MEDICATIONS


Home Medications: 


                                Ambulatory Orders











 Medication  Instructions  Recorded  Confirmed


 


Carbidopa/Levodopa 25/100 [Sinemet 2 tab PO UD 08/23/21 08/23/21





25 mg/100 mg]   


 


Gabapentin [Neurontin] 2 tab PO HS 08/23/21 08/23/21


 


Ropinirole HCl [Ropinirole ER] 2 mg PO BID 08/23/21 08/23/21


 


Sertraline [Zoloft] 1 tab PO DAILY 08/23/21 08/23/21


 


Acetaminophen 1,000 mg/100 ml 1,000 ml IV Q6H PRN 08/26/21 





[Ofirmev]   


 


levoFLOXacin 750 MG/150  mg IV Q24H #5 ml 08/26/21 





[Levaquin 750 mg/150 ml]   


 


oxyCODONE [Roxicodone] 5 mg PO Q6HR PRN #7 tablet 08/26/21 














- PHYSICAL EXAM AT DISCHARGE


General Appearance: positive: No acute distress, Alert


Eyes Bilateral: positive: Normal inspection, EOMI


ENT: positive: ENT inspection nml, Pharynx nml


Neck: positive: Nml inspection, Thyroid nml


Respiratory: positive: Chest non-tender, No respiratory distress, Breath sounds 

nml


Cardiovascular: positive: Regular rate & rhythm


Abdomen: positive: Nml bowel sounds, Tenderness (Minimal), Other (Wounds are 

clean and dry.  The drain is removed at the bedside without difficulty)


Back: positive: Nml inspection


Skin: positive: Color nml


Extremities: positive: Non-tender


Neurologic/Psychiatric: positive: Oriented x3





- LABS


Result Diagrams: 


                                 08/26/21 10:53





                                 08/26/21 10:53





- DIAGNOSTIC IMAGING


Diagnostic Imaging Results: Final report reviewed





- SEPSIS


Current Stage of Sepsis: Resolved





- QUALITY (Female Hip Fx Only)


Was patient sent home on osteoporosis medication?: No





- FOLLOW UP


Follow Up: 





Dr Godfrey in 2 weeks





- TIME SPENT


Time Spent in Discharge (Minutes): 20

## 2022-01-06 ENCOUNTER — HOSPITAL ENCOUNTER (OUTPATIENT)
Dept: HOSPITAL 76 - DI.N | Age: 57
Discharge: HOME | End: 2022-01-06
Attending: INTERNAL MEDICINE
Payer: MEDICARE

## 2022-01-06 DIAGNOSIS — M25.512: Primary | ICD-10-CM

## 2022-01-06 NOTE — XRAY REPORT
PROCEDURE:  Shoulder 3 View LT

 

INDICATIONS:  LEFT SHOULDER PAIN S/P FALL LAST WEEK

 

TECHNIQUE:  3 views of the shoulder were acquired.  

 

COMPARISON:  None.

 

FINDINGS:  

 

Bones:  No fractures or dislocations.  No suspicious bony lesions.  Visualized ribs appear intact.  M
ild to moderate acromioclavicular narrowing.

 

Soft tissues:  No suspicious soft tissue calcifications.  Left upper lobe is obscured by pacer device
.

 

IMPRESSION:  No visualized acute fracture or dislocation. However, occult injury cannot be excluded. 
Recommend short interval imaging follow-up in 7-10 days as clinically indicated for additional evalua
tion.

 

Reviewed by: Juany Chaparro MD on 1/6/2022 3:46 PM PST

Approved by: Juany Chaparro MD on 1/6/2022 3:46 PM PST

 

 

Station ID:  [id number]

## 2023-09-11 ENCOUNTER — HOSPITAL ENCOUNTER (OUTPATIENT)
Dept: HOSPITAL 76 - LAB.N | Age: 58
Discharge: HOME | End: 2023-09-11
Attending: NURSE PRACTITIONER
Payer: MEDICARE

## 2023-09-11 ENCOUNTER — HOSPITAL ENCOUNTER (OUTPATIENT)
Dept: HOSPITAL 76 - DI | Age: 58
Discharge: HOME | End: 2023-09-11
Attending: NURSE PRACTITIONER
Payer: MEDICARE

## 2023-09-11 DIAGNOSIS — R05.3: Primary | ICD-10-CM

## 2023-09-11 DIAGNOSIS — Z20.822: ICD-10-CM

## 2023-09-11 NOTE — XRAY REPORT
PROCEDURE:  Chest 2 View X-Ray

 

INDICATIONS:  CHRONIC COUGH

 

TECHNIQUE:  2 views of the chest were acquired.  

 

COMPARISON:  2/23/2018.

 

FINDINGS:  

 

Surgical changes and devices:  Left chest wall stimulator is seen..  

 

Lungs and pleura:  No pleural effusions or pneumothorax.  Lungs are clear.  

 

Mediastinum:  Mediastinal contours appear normal.  Heart size is normal.  

 

Bones and chest wall:  No suspicious bony lesions.  Overlying soft tissues appear unremarkable.  

 

 

IMPRESSION:  

 

No acute cardiopulmonary process.

 

 

 

Reviewed by: Sameer Fernandes MD on 9/11/2023 5:28 PM PDT

Approved by: Sameer Fernandes MD on 9/11/2023 5:28 PM PDT

 

 

Station ID:  [id number]

## 2023-10-31 ENCOUNTER — HOSPITAL ENCOUNTER (OUTPATIENT)
Dept: HOSPITAL 76 - LAB.N | Age: 58
Discharge: HOME | End: 2023-10-31
Attending: NURSE PRACTITIONER
Payer: MEDICARE

## 2023-10-31 DIAGNOSIS — R53.83: ICD-10-CM

## 2023-10-31 DIAGNOSIS — R25.1: ICD-10-CM

## 2023-10-31 DIAGNOSIS — F41.9: ICD-10-CM

## 2023-10-31 DIAGNOSIS — Z12.5: ICD-10-CM

## 2023-10-31 DIAGNOSIS — I10: Primary | ICD-10-CM

## 2023-10-31 DIAGNOSIS — E78.5: ICD-10-CM

## 2023-10-31 LAB
ALBUMIN DIAFP-MCNC: 4.4 G/DL (ref 3.2–5.5)
ALBUMIN/GLOB SERPL: 1.5 {RATIO} (ref 1–2.2)
ALP SERPL-CCNC: 85 IU/L (ref 42–121)
ALT SERPL W P-5'-P-CCNC: 11 IU/L (ref 10–60)
ANION GAP SERPL CALCULATED.4IONS-SCNC: 5 MMOL/L (ref 6–13)
AST SERPL W P-5'-P-CCNC: 54 IU/L (ref 10–42)
BASOPHILS # BLD MANUAL: 0 10^3/UL (ref 0–0.1)
BASOPHILS NFR BLD AUTO: 0.9 %
BILIRUB BLD-MCNC: 0.7 MG/DL (ref 0.2–1)
BUN SERPL-MCNC: 12 MG/DL (ref 6–20)
CALCIUM UR-MCNC: 9.4 MG/DL (ref 8.5–10.3)
CHLORIDE SERPL-SCNC: 102 MMOL/L (ref 101–111)
CHOLEST SERPL-MCNC: 193 MG/DL
CO2 SERPL-SCNC: 29 MMOL/L (ref 21–32)
CREAT SERPLBLD-SCNC: 0.9 MG/DL (ref 0.6–1.3)
EOSINOPHIL # BLD MANUAL: 0.2 10^3/UL (ref 0–0.7)
EOSINOPHIL NFR BLD AUTO: 4.6 %
ERYTHROCYTE [DISTWIDTH] IN BLOOD BY AUTOMATED COUNT: 13 % (ref 12–15)
GFRSERPLBLD MDRD-ARVRAT: 87 ML/MIN/{1.73_M2} (ref 89–?)
GLOBULIN SER-MCNC: 2.9 G/DL (ref 2.1–4.2)
GLUCOSE SERPL-MCNC: 117 MG/DL (ref 74–104)
HCT VFR BLD AUTO: 48.5 % (ref 42–52)
HDLC SERPL-MCNC: 35 MG/DL
HDLC SERPL: 5.5 {RATIO} (ref ?–5)
HGB UR QL STRIP: 16.5 G/DL (ref 14–18)
LDLC SERPL CALC-MCNC: 121 MG/DL
LDLC/HDLC SERPL: 3.5 {RATIO} (ref ?–3.6)
LYMPH ABN NFR BLD MANUAL: 0 %
LYMPHOBLASTS # BLD: 23 %
LYMPHOCYTES # BLD MANUAL: 3.3 10^3/UL (ref 1.5–3.5)
LYMPHOCYTES NFR BLD AUTO: 33.8 %
MANUAL DIF COMMENT BLD-IMP: (no result)
MCH RBC QN AUTO: 30.8 PG (ref 27–31)
MCHC RBC AUTO-ENTMCNC: 34 G/DL (ref 32–36)
MCV RBC AUTO: 90.5 FL (ref 80–94)
MONOCYTES # BLD MANUAL: 0.2 10^3/UL (ref 0–1)
MONOCYTES NFR BLD AUTO: 8.2 %
NEUTROPHILS # SNV AUTO: 8 X10^3/UL (ref 4.8–10.8)
NEUTROPHILS NFR BLD AUTO: 52.2 %
NEUTROPHILS NFR BLD MANUAL: 4.2 10^3/UL (ref 1.5–6.6)
NEUTS BAND NFR BLD: 1 %
PDW BLD AUTO: 10.2 FL (ref 7.4–11.4)
PLAT MORPH BLD: (no result)
PLATELET # BLD: 163 10^3/UL (ref 130–450)
PLATELET BLD QL SMEAR: (no result)
POTASSIUM SERPL-SCNC: 4.7 MMOL/L (ref 3.5–4.5)
PROT SPEC-MCNC: 7.3 G/DL (ref 6.4–8.9)
RBC MAR: 5.36 10^6/UL (ref 4.7–6.1)
RBC MORPH BLD: (no result)
SODIUM SERPLBLD-SCNC: 136 MMOL/L (ref 135–145)
TRIGL P FAST SERPL-MCNC: 187 MG/DL (ref 48–352)
TSH SERPL-ACNC: 2.12 UIU/ML (ref 0.34–5.6)
VARIANT LYMPHS NFR BLD MANUAL: 18 %
VLDLC SERPL-SCNC: 37 MG/DL

## 2023-10-31 PROCEDURE — 36415 COLL VENOUS BLD VENIPUNCTURE: CPT

## 2023-10-31 PROCEDURE — 80053 COMPREHEN METABOLIC PANEL: CPT

## 2023-10-31 PROCEDURE — 83721 ASSAY OF BLOOD LIPOPROTEIN: CPT

## 2023-10-31 PROCEDURE — 84153 ASSAY OF PSA TOTAL: CPT

## 2023-10-31 PROCEDURE — 80061 LIPID PANEL: CPT

## 2023-10-31 PROCEDURE — 85025 COMPLETE CBC W/AUTO DIFF WBC: CPT

## 2023-10-31 PROCEDURE — 84443 ASSAY THYROID STIM HORMONE: CPT
